# Patient Record
Sex: FEMALE | Race: WHITE | NOT HISPANIC OR LATINO | Employment: FULL TIME | URBAN - METROPOLITAN AREA
[De-identification: names, ages, dates, MRNs, and addresses within clinical notes are randomized per-mention and may not be internally consistent; named-entity substitution may affect disease eponyms.]

---

## 2020-08-21 ENCOUNTER — SOCIAL WORK (OUTPATIENT)
Dept: ADMINISTRATIVE | Facility: OTHER | Age: 30
End: 2020-08-21

## 2020-08-21 NOTE — PROGRESS NOTES
SW met with pt regarding initial OB assessment. Pt stated this is her 3rd pregnancy/1-miscarriage. Pt stated lives alone with her child and able to perform ADL's independently. Pt stated does not have medicaid. Pt informed to go down stair to the financial assistance office and apply for medicaid.Pt stated does not have WIC. SW provide pt with information on other community resources.  No other needs identified at this time.    Cindy Domínguez,MSW  Pager#[phone]

## 2020-10-08 PROBLEM — Z34.82 SUPERVISION OF NORMAL INTRAUTERINE PREGNANCY IN MULTIGRAVIDA, SECOND TRIMESTER: Status: ACTIVE | Noted: 2020-10-08

## 2021-02-16 PROBLEM — O36.5930 POOR FETAL GROWTH AFFECTING MANAGEMENT OF MOTHER IN THIRD TRIMESTER: Status: ACTIVE | Noted: 2021-02-16

## 2021-02-23 PROBLEM — R87.610 ASCUS WITH POSITIVE HIGH RISK HPV CERVICAL: Status: ACTIVE | Noted: 2018-03-07

## 2021-02-23 PROBLEM — O60.00 PRETERM LABOR: Status: ACTIVE | Noted: 2021-02-23

## 2021-02-23 PROBLEM — O42.919 PRETERM PREMATURE RUPTURE OF MEMBRANES (PPROM) WITH UNKNOWN ONSET OF LABOR: Status: RESOLVED | Noted: 2021-02-23 | Resolved: 2021-02-23

## 2021-02-23 PROBLEM — Z87.51 HISTORY OF PRETERM LABOR: Status: ACTIVE | Noted: 2018-08-09

## 2021-02-23 PROBLEM — R87.810 ASCUS WITH POSITIVE HIGH RISK HPV CERVICAL: Status: ACTIVE | Noted: 2018-03-07

## 2021-02-23 PROBLEM — O42.919 PRETERM PREMATURE RUPTURE OF MEMBRANES (PPROM) WITH UNKNOWN ONSET OF LABOR: Status: ACTIVE | Noted: 2021-02-23

## 2021-03-17 ENCOUNTER — SOCIAL WORK (OUTPATIENT)
Dept: ADMINISTRATIVE | Facility: OTHER | Age: 31
End: 2021-03-17

## 2021-05-12 ENCOUNTER — PATIENT MESSAGE (OUTPATIENT)
Dept: RESEARCH | Facility: HOSPITAL | Age: 31
End: 2021-05-12

## 2021-05-31 PROBLEM — O36.5930 POOR FETAL GROWTH AFFECTING MANAGEMENT OF MOTHER IN THIRD TRIMESTER: Status: RESOLVED | Noted: 2021-02-16 | Resolved: 2021-05-31

## 2022-05-20 ENCOUNTER — PATIENT MESSAGE (OUTPATIENT)
Dept: ADMINISTRATIVE | Facility: OTHER | Age: 32
End: 2022-05-20

## 2022-08-12 ENCOUNTER — PATIENT MESSAGE (OUTPATIENT)
Dept: ADMINISTRATIVE | Facility: OTHER | Age: 32
End: 2022-08-12

## 2022-08-17 PROBLEM — O09.292 IUGR (INTRAUTERINE GROWTH RESTRICTION) IN PRIOR PREGNANCY, PREGNANT, SECOND TRIMESTER: Status: ACTIVE | Noted: 2022-08-17

## 2022-08-17 PROBLEM — O09.92 HIGH-RISK PREGNANCY IN SECOND TRIMESTER: Status: ACTIVE | Noted: 2022-08-17

## 2022-08-17 PROBLEM — O99.332 PREGNANCY COMPLICATED BY TOBACCO USE IN SECOND TRIMESTER: Status: ACTIVE | Noted: 2022-08-17

## 2022-09-02 ENCOUNTER — PATIENT MESSAGE (OUTPATIENT)
Dept: ADMINISTRATIVE | Facility: OTHER | Age: 32
End: 2022-09-02

## 2022-09-09 PROBLEM — O09.93 HIGH-RISK PREGNANCY IN THIRD TRIMESTER: Status: ACTIVE | Noted: 2022-08-17

## 2022-09-28 PROBLEM — O36.5990 PREGNANCY AFFECTED BY FETAL GROWTH RESTRICTION: Status: ACTIVE | Noted: 2022-09-28

## 2022-11-01 PROBLEM — O99.333 PREGNANCY COMPLICATED BY TOBACCO USE IN THIRD TRIMESTER: Status: ACTIVE | Noted: 2022-08-17

## 2022-11-01 PROBLEM — Z30.09 ENCOUNTER FOR GENERAL COUNSELING AND ADVICE ON CONTRACEPTIVE MANAGEMENT: Status: ACTIVE | Noted: 2022-11-01

## 2022-11-01 PROBLEM — O09.292 IUGR (INTRAUTERINE GROWTH RESTRICTION) IN PRIOR PREGNANCY, PREGNANT, SECOND TRIMESTER: Status: RESOLVED | Noted: 2022-08-17 | Resolved: 2022-11-01

## 2022-11-11 PROBLEM — O36.5990 PREGNANCY AFFECTED BY FETAL GROWTH RESTRICTION: Status: RESOLVED | Noted: 2022-09-28 | Resolved: 2022-11-11
